# Patient Record
Sex: FEMALE | Race: WHITE | NOT HISPANIC OR LATINO
[De-identification: names, ages, dates, MRNs, and addresses within clinical notes are randomized per-mention and may not be internally consistent; named-entity substitution may affect disease eponyms.]

---
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
## 2023-04-10 ENCOUNTER — NON-APPOINTMENT (OUTPATIENT)
Age: 18
End: 2023-04-10

## 2023-07-14 ENCOUNTER — NON-APPOINTMENT (OUTPATIENT)
Age: 18
End: 2023-07-14

## 2023-09-26 ENCOUNTER — APPOINTMENT (OUTPATIENT)
Dept: RADIOLOGY | Facility: HOSPITAL | Age: 18
End: 2023-09-26
Payer: COMMERCIAL

## 2023-09-26 ENCOUNTER — APPOINTMENT (OUTPATIENT)
Dept: PEDIATRICS | Facility: CLINIC | Age: 18
End: 2023-09-26
Payer: COMMERCIAL

## 2023-09-26 ENCOUNTER — OUTPATIENT (OUTPATIENT)
Dept: OUTPATIENT SERVICES | Facility: HOSPITAL | Age: 18
LOS: 1 days | End: 2023-09-26
Payer: COMMERCIAL

## 2023-09-26 DIAGNOSIS — J35.8 OTHER CHRONIC DISEASES OF TONSILS AND ADENOIDS: ICD-10-CM

## 2023-09-26 DIAGNOSIS — Z87.898 PERSONAL HISTORY OF OTHER SPECIFIED CONDITIONS: ICD-10-CM

## 2023-09-26 DIAGNOSIS — S99.922A UNSPECIFIED INJURY OF LEFT FOOT, INITIAL ENCOUNTER: ICD-10-CM

## 2023-09-26 DIAGNOSIS — Z86.19 PERSONAL HISTORY OF OTHER INFECTIOUS AND PARASITIC DISEASES: ICD-10-CM

## 2023-09-26 PROCEDURE — 73630 X-RAY EXAM OF FOOT: CPT

## 2023-09-26 PROCEDURE — 73630 X-RAY EXAM OF FOOT: CPT | Mod: 26,LT

## 2023-09-26 PROCEDURE — 99214 OFFICE O/P EST MOD 30 MIN: CPT

## 2023-09-26 RX ORDER — ERGOCALCIFEROL 1.25 MG/1
1.25 MG CAPSULE, LIQUID FILLED ORAL
Qty: 6 | Refills: 0 | Status: DISCONTINUED | COMMUNITY
Start: 2019-12-23 | End: 2023-09-26

## 2023-10-30 PROBLEM — S90.122A: Status: RESOLVED | Noted: 2023-09-26 | Resolved: 2023-10-30

## 2023-10-30 PROBLEM — E66.9 OBESITY PEDS (BMI >=95 PERCENTILE): Status: ACTIVE | Noted: 2021-07-25

## 2023-10-30 PROBLEM — S90.122A CONTUSION OF TOE OF LEFT FOOT, INITIAL ENCOUNTER: Status: RESOLVED | Noted: 2023-09-26 | Resolved: 2023-10-30

## 2023-10-30 PROBLEM — S99.922A INJURY OF LEFT FOOT, INITIAL ENCOUNTER: Status: RESOLVED | Noted: 2023-09-26 | Resolved: 2023-10-30

## 2023-10-30 PROBLEM — F90.9 ADHD (ATTENTION DEFICIT HYPERACTIVITY DISORDER): Status: ACTIVE | Noted: 2017-08-01

## 2023-10-31 ENCOUNTER — APPOINTMENT (OUTPATIENT)
Dept: PEDIATRICS | Facility: CLINIC | Age: 18
End: 2023-10-31
Payer: COMMERCIAL

## 2023-10-31 VITALS
WEIGHT: 216.13 LBS | HEART RATE: 72 BPM | HEIGHT: 65.25 IN | DIASTOLIC BLOOD PRESSURE: 77 MMHG | SYSTOLIC BLOOD PRESSURE: 121 MMHG | BODY MASS INDEX: 35.57 KG/M2

## 2023-10-31 DIAGNOSIS — S99.922A UNSPECIFIED INJURY OF LEFT FOOT, INITIAL ENCOUNTER: ICD-10-CM

## 2023-10-31 DIAGNOSIS — Z11.3 ENCOUNTER FOR SCREENING FOR INFECTIONS WITH A PREDOMINANTLY SEXUAL MODE OF TRANSMISSION: ICD-10-CM

## 2023-10-31 DIAGNOSIS — F90.9 ATTENTION-DEFICIT HYPERACTIVITY DISORDER, UNSPECIFIED TYPE: ICD-10-CM

## 2023-10-31 DIAGNOSIS — R19.7 DIARRHEA, UNSPECIFIED: ICD-10-CM

## 2023-10-31 DIAGNOSIS — S90.122A CONTUSION OF LEFT LESSER TOE(S) W/OUT DAMAGE TO NAIL, INITIAL ENCOUNTER: ICD-10-CM

## 2023-10-31 DIAGNOSIS — E66.9 OBESITY, UNSPECIFIED: ICD-10-CM

## 2023-10-31 DIAGNOSIS — Z00.00 ENCOUNTER FOR GENERAL ADULT MEDICAL EXAMINATION W/OUT ABNORMAL FINDINGS: ICD-10-CM

## 2023-10-31 PROCEDURE — 99395 PREV VISIT EST AGE 18-39: CPT

## 2023-10-31 PROCEDURE — 96160 PT-FOCUSED HLTH RISK ASSMT: CPT | Mod: 59

## 2023-10-31 RX ORDER — METHYLPHENIDATE HYDROCHLORIDE 72 MG/1
72 TABLET, EXTENDED RELEASE ORAL
Refills: 0 | Status: DISCONTINUED | COMMUNITY
Start: 2019-11-26 | End: 2023-10-31

## 2023-11-02 ENCOUNTER — APPOINTMENT (OUTPATIENT)
Dept: PEDIATRICS | Facility: CLINIC | Age: 18
End: 2023-11-02
Payer: COMMERCIAL

## 2023-11-02 VITALS — TEMPERATURE: 98.1 F

## 2023-11-02 DIAGNOSIS — H10.33 UNSPECIFIED ACUTE CONJUNCTIVITIS, BILATERAL: ICD-10-CM

## 2023-11-02 PROCEDURE — 99214 OFFICE O/P EST MOD 30 MIN: CPT

## 2023-11-02 RX ORDER — POLYMYXIN B SULFATE AND TRIMETHOPRIM 10000; 1 [USP'U]/ML; MG/ML
10000-0.1 SOLUTION OPHTHALMIC 4 TIMES DAILY
Qty: 40 | Refills: 0 | Status: ACTIVE | COMMUNITY
Start: 2023-11-02 | End: 1900-01-01

## 2023-11-03 LAB
ALBUMIN SERPL ELPH-MCNC: 4.6 G/DL
ALP BLD-CCNC: 70 U/L
ALT SERPL-CCNC: 17 U/L
ANION GAP SERPL CALC-SCNC: 11 MMOL/L
AST SERPL-CCNC: 13 U/L
BASOPHILS # BLD AUTO: 0.08 K/UL
BASOPHILS NFR BLD AUTO: 0.8 %
BILIRUB SERPL-MCNC: 0.6 MG/DL
BUN SERPL-MCNC: 11 MG/DL
C TRACH RRNA SPEC QL NAA+PROBE: NOT DETECTED
CALCIUM SERPL-MCNC: 9.8 MG/DL
CHLORIDE SERPL-SCNC: 103 MMOL/L
CHOLEST SERPL-MCNC: 146 MG/DL
CO2 SERPL-SCNC: 26 MMOL/L
CREAT SERPL-MCNC: 0.65 MG/DL
CRP SERPL-MCNC: 6 MG/L
EGFR: 129 ML/MIN/1.73M2
ENDOMYSIUM IGA SER QL: NEGATIVE
ENDOMYSIUM IGA TITR SER: NORMAL
EOSINOPHIL # BLD AUTO: 0.33 K/UL
EOSINOPHIL NFR BLD AUTO: 3.5 %
ERYTHROCYTE [SEDIMENTATION RATE] IN BLOOD BY WESTERGREN METHOD: 31 MM/HR
ESTIMATED AVERAGE GLUCOSE: 108 MG/DL
GLIADIN IGA SER QL: <5 UNITS
GLIADIN IGG SER QL: <5 UNITS
GLIADIN PEPTIDE IGA SER-ACNC: NEGATIVE
GLIADIN PEPTIDE IGG SER-ACNC: NEGATIVE
GLUCOSE SERPL-MCNC: 92 MG/DL
HBA1C MFR BLD HPLC: 5.4 %
HCT VFR BLD CALC: 40.2 %
HDLC SERPL-MCNC: 48 MG/DL
HGB BLD-MCNC: 13.4 G/DL
HIV1+2 AB SPEC QL IA.RAPID: NONREACTIVE
HSV 1+2 IGG SER IA-IMP: NEGATIVE
HSV 1+2 IGG SER IA-IMP: NEGATIVE
HSV1 IGG SER QL: 0.15 INDEX
HSV2 IGG SER QL: 0.12 INDEX
IGA SER QL IEP: 92 MG/DL
IGE SER-MCNC: 15 KU/L
IGG SER QL IEP: 1153 MG/DL
IGM SER QL IEP: 74 MG/DL
IMM GRANULOCYTES NFR BLD AUTO: 0.2 %
LDLC SERPL CALC-MCNC: 87 MG/DL
LYMPHOCYTES # BLD AUTO: 2.72 K/UL
LYMPHOCYTES NFR BLD AUTO: 28.5 %
MAN DIFF?: NORMAL
MCHC RBC-ENTMCNC: 28.7 PG
MCHC RBC-ENTMCNC: 33.3 GM/DL
MCV RBC AUTO: 86.1 FL
MONOCYTES # BLD AUTO: 0.44 K/UL
MONOCYTES NFR BLD AUTO: 4.6 %
N GONORRHOEA RRNA SPEC QL NAA+PROBE: NOT DETECTED
NEUTROPHILS # BLD AUTO: 5.96 K/UL
NEUTROPHILS NFR BLD AUTO: 62.4 %
NONHDLC SERPL-MCNC: 98 MG/DL
PLATELET # BLD AUTO: 264 K/UL
POTASSIUM SERPL-SCNC: 4.1 MMOL/L
PROT SERPL-MCNC: 7.2 G/DL
RBC # BLD: 4.67 M/UL
RBC # FLD: 13.5 %
SODIUM SERPL-SCNC: 140 MMOL/L
SOURCE AMPLIFICATION: NORMAL
T PALLIDUM AB SER QL IA: NEGATIVE
T4 FREE SERPL-MCNC: 1.4 NG/DL
TRIGL SERPL-MCNC: 52 MG/DL
TSH SERPL-ACNC: 1.27 UIU/ML
TTG IGA SER IA-ACNC: <1.2 U/ML
TTG IGA SER-ACNC: NEGATIVE
TTG IGG SER IA-ACNC: 1.3 U/ML
TTG IGG SER IA-ACNC: NEGATIVE
WBC # FLD AUTO: 9.55 K/UL

## 2024-01-30 RX ORDER — CIPROFLOXACIN 3 MG/ML
0.3 SOLUTION OPHTHALMIC
Qty: 1 | Refills: 1 | Status: ACTIVE | COMMUNITY
Start: 2024-01-30 | End: 1900-01-01

## 2024-07-21 ENCOUNTER — NON-APPOINTMENT (OUTPATIENT)
Age: 19
End: 2024-07-21

## 2024-08-05 ENCOUNTER — APPOINTMENT (OUTPATIENT)
Dept: OBGYN | Facility: CLINIC | Age: 19
End: 2024-08-05

## 2024-08-05 PROBLEM — Z01.419 WELL WOMAN EXAM WITH ROUTINE GYNECOLOGICAL EXAM: Status: ACTIVE | Noted: 2024-08-05

## 2024-08-05 PROBLEM — N94.6 DYSMENORRHEA: Status: ACTIVE | Noted: 2024-08-05

## 2024-08-05 PROBLEM — N76.0 VAGINITIS: Status: ACTIVE | Noted: 2024-08-05 | Resolved: 2024-09-04

## 2024-08-05 PROCEDURE — 99385 PREV VISIT NEW AGE 18-39: CPT

## 2024-08-05 PROCEDURE — 99459 PELVIC EXAMINATION: CPT

## 2024-08-05 NOTE — DISCUSSION/SUMMARY
[FreeTextEntry1] : I spent the time noted on the day of this patient encounter preparing for, providing and documenting the above service. I have  counseled and educated the patient on the differential, workup, disease course, and treatment/management plan. Education was provided to the patient during this encounter. All questions and concerns were answered and addressed in detail.  Imani Taveras NP, FNP-BC

## 2024-08-05 NOTE — HISTORY OF PRESENT ILLNESS
[FreeTextEntry1] : Pt is a 19-year-old who presents today for well woman exam. Pt is being treated for strep infection at this time. LMP: 7/28/2024 POB: denies PGYN: Regular, denies vaginal infections  PMH: denies PSH: denies Med: denies All: NKDA SH: social ETOH and recreational marijuana  FH: Maternal grandmother with colon cancer   Pt is sexually active with 1 partner and uses protection at all times. Pt would like to discuss birth control counseling for dysmenorrhea.

## 2024-08-05 NOTE — PHYSICAL EXAM
[Chaperone Present] : A chaperone was present in the examining room during all aspects of the physical examination [[id number]] : A chaperone was present during the pelvic exam. [FreeTextEntry2] : Gayle [Appropriately responsive] : appropriately responsive [Alert] : alert [No Acute Distress] : no acute distress [Soft] : soft [Non-tender] : non-tender [Non-distended] : non-distended [No HSM] : No HSM [No Lesions] : no lesions [No Mass] : no mass [Oriented x3] : oriented x3 [Examination Of The Breasts] : a normal appearance [No Masses] : no breast masses were palpable [Labia Majora] : normal [Labia Minora] : normal [Normal] : normal [Uterine Adnexae] : normal

## 2024-08-05 NOTE — PLAN
[FreeTextEntry1] : We have discussed at length the alternatives of birth control. In our discussion we specifically reviewed short term BC such as oral contraceptives, the contraceptive patches and the contraceptive vaginal ring. We also reviewed LARC--in our review we specifically discussed Nexplanon, and both the hormonal IUDs and non-hormonal IUD.   Pt will start Mary OCP  F/U in 3 months

## 2024-08-12 ENCOUNTER — NON-APPOINTMENT (OUTPATIENT)
Age: 19
End: 2024-08-12

## 2024-12-11 ENCOUNTER — APPOINTMENT (OUTPATIENT)
Dept: SURGERY | Facility: CLINIC | Age: 19
End: 2024-12-11
Payer: COMMERCIAL

## 2024-12-11 DIAGNOSIS — E66.9 OBESITY, UNSPECIFIED: ICD-10-CM

## 2024-12-11 DIAGNOSIS — Z80.0 FAMILY HISTORY OF MALIGNANT NEOPLASM OF DIGESTIVE ORGANS: ICD-10-CM

## 2024-12-11 DIAGNOSIS — B99.9 UMBILICAL GRANULOMA: ICD-10-CM

## 2024-12-11 PROCEDURE — 99203 OFFICE O/P NEW LOW 30 MIN: CPT
